# Patient Record
Sex: FEMALE | Race: WHITE | NOT HISPANIC OR LATINO | Employment: PART TIME | URBAN - METROPOLITAN AREA
[De-identification: names, ages, dates, MRNs, and addresses within clinical notes are randomized per-mention and may not be internally consistent; named-entity substitution may affect disease eponyms.]

---

## 2018-01-10 NOTE — PROGRESS NOTES
Assessment    1  Encounter for preventive health examination (V70 0) (Z00 00)   2  Need for lipid screening (V77 91) (Z13 220)   3  Encounter for screening for cardiovascular disorders (V81 2) (Z13 6)    Plan  Encounter for screening for cardiovascular disorders, Need for lipid screening    · (1) COMPREHENSIVE METABOLIC PANEL; Status:Active; Requested for:02Nov2016;    · (1) LIPID PANEL, FASTING; Status:Active; Requested [id number]; Health Maintenance    · Follow-up visit in 1 year Evaluation and Treatment  Follow-up  Status: Hold For -  Scheduling  Requested for: 02NSG4038   · Begin a limited exercise program ; Status:Complete;   Done: 89KTY9573   · Brush your teeth 3 times a day and floss at least once a day ; Status:Complete;   Done:  91KST1921   · Drink plenty of fluids ; Status:Complete;   Done: 94ZJS0389   · Eat a low fat and low cholesterol diet ; Status:Complete;   Done: 78PKW3448   · Eat foods that are high in calcium ; Status:Complete;   Done: 33HKO3299   · Use a sun block product with an SPF of 15 or more ; Status:Complete;   Done:  77RLY8008   · Vitamins can help you get daily requirements that your diet may not be giving you ;  Status:Complete;   Done: 05KJE9100   · We recommend routine visits to a dentist ; Status:Complete;   Done: 96AIA1881   · Call [phone] if: You find a new or different kind of lump in your breast ;  Status:Complete;   Done: 21JRG9837   · Call [phone] if: You have any warning signs of skin cancer ; Status:Complete;    Done: 12NGV3177   · *VB-Depression Screening; Status:Complete;   Done: 21RTF6272 01:43PM    Discussion/Summary  health maintenance visit Currently, she eats an adequate diet and has an adequate exercise regimen   the risks and benefits of cervical cancer screening were discussed cervical cancer screening is current cervical cancer screening is needed every three years 2 years ago at [facility] cancer screening: the risks and benefits of breast cancer screening were discussed  Colorectal cancer screening: the risks and benefits of colorectal cancer screening were discussed and colorectal cancer screening is not indicated  Osteoporosis screening: the risks and benefits of osteoporosis screening were discussed and bone mineral density testing is not indicated  Screening lab work includes glucose and lipid profile  The patient declines immunizations  She was advised to be evaluated by an ophthalmologist and a dentist  Advice and education were given regarding nutrition, calcium supplements, vitamin D supplements, contraception, sunscreen use, self skin examination, helmet use, seat belt use and fall risk reduction  Patient discussion: discussed with the patient  Chief Complaint  new pt here looking for new PCP  History of Present Illness  HM, Adult Female: The patient is being seen for a health maintenance and pap smear 2 years ago- [facility] evaluation  The last health maintenance visit was 1 year(s) ago  General Health: The patient's health since the last visit is described as good  She has regular dental visits  She denies vision problems  She denies hearing loss  Immunizations status: up to date  Lifestyle:  She consumes a diverse and healthy diet  She does not have any weight concerns  She exercises regularly  She does not use tobacco  She consumes alcohol  She denies drug use  Reproductive health:  she reports normal menses  Menstrual history:  age at menarche was 5  Recent menstrual periods: bleeding has been normal  The cycles have been regular  The duration of her recent periods has been regular  she uses no contraception  she is sexually active  she denies prior pregnancies  Screening: cancer screening reviewed and updated  metabolic screening reviewed and updated  risk screening reviewed and updated        Review of Systems    Constitutional: No fever, no chills, feels well, no tiredness, no recent weight gain or weight loss    Eyes: No complaints of eye pain, no red eyes, no eyesight problems, no discharge, no dry eyes, no itching of eyes  ENT: no complaints of earache, no loss of hearing, no nose bleeds, no nasal discharge, no sore throat, no hoarseness  Cardiovascular: No complaints of slow heart rate, no fast heart rate, no chest pain, no palpitations, no leg claudication, no lower extremity edema  Respiratory: No complaints of shortness of breath, no wheezing, no cough, no SOB on exertion, no orthopnea, no PND  Gastrointestinal: No complaints of abdominal pain, no constipation, no nausea or vomiting, no diarrhea, no bloody stools  Genitourinary: No complaints of dysuria, no incontinence, no pelvic pain, no dysmenorrhea, no vaginal discharge or bleeding  Musculoskeletal: No complaints of arthralgias, no myalgias, no joint swelling or stiffness, no limb pain or swelling  Integumentary: No complaints of skin rash or lesions, no itching, no skin wounds, no breast pain or lump  Neurological: No complaints of headache, no confusion, no convulsions, no numbness, no dizziness or fainting, no tingling, no limb weakness, no difficulty walking  Psychiatric: Not suicidal, no sleep disturbance, no anxiety or depression, no change in personality, no emotional problems  Endocrine: No complaints of proptosis, no hot flashes, no muscle weakness, no deepening of the voice, no feelings of weakness  Hematologic/Lymphatic: No complaints of swollen glands, no swollen glands in the neck, does not bleed easily, does not bruise easily        Past Medical History    · History of Broken shoulder (812 00) (S42 90XA)   · History of fracture of rib (V15 51) (Z87 81)   · History of Torn ACL (844 2) (W98 743O)    Family History  Mother    · Family history of arteritis (V17 49) (Z82 49)   · Family history of malignant neoplasm of uterus (V16 49) (Z80 49)  Father    · Family history of malignant neoplasm of prostate (V16 42) (E80 51)    Social History    · Never a smoker   · Social drinker (V49 89) (Z78 9)    Current Meds   1  No Reported Medications Recorded    Allergies    1  No Known Drug Allergies    Vitals   Recorded: 13YQB1830 44:79NW   Systolic 260   Diastolic 64   Heart Rate 72   Respiration 16   Temperature 98 1 F   Height 5 ft 3 94 in   Weight 176 lb 2 oz   BMI Calculated 30 29   BSA Calculated 1 85     Physical Exam    Constitutional   General appearance: No acute distress, well appearing and well nourished  Head and Face   Head and face: Normal     Palpation of the face and sinuses: No sinus tenderness  Eyes   Conjunctiva and lids: No swelling, erythema or discharge  Pupils and irises: Equal, round, reactive to light  Ophthalmoscopic examination: Normal fundi and optic discs  Ears, Nose, Mouth, and Throat   External inspection of ears and nose: Normal     Otoscopic examination: Tympanic membranes translucent with normal light reflex  Canals patent without erythema  Hearing: Normal     Nasal mucosa, septum, and turbinates: Normal without edema or erythema  Lips, teeth, and gums: Normal, good dentition  Oropharynx: Normal with no erythema, edema, exudate or lesions  Neck   Neck: Supple, symmetric, trachea midline, no masses  Thyroid: Normal, no thyromegaly  Pulmonary   Respiratory effort: No increased work of breathing or signs of respiratory distress  Percussion of chest: Normal     Palpation of chest: Normal     Auscultation of lungs: Clear to auscultation  Cardiovascular   Palpation of heart: Normal PMI, no thrills  Auscultation of heart: Normal rate and rhythm, normal S1 and S2, no murmurs  Examination of extremities for edema and/or varicosities: Normal     Chest   Chest: Normal     Abdomen   Abdomen: Non-tender, no masses  Liver and spleen: No hepatomegaly or splenomegaly  Examination for hernias: No hernia appreciated      Genitourinary   External genitalia and vagina: Normal, no lesions appreciated  Urethra: Normal, no discharge  Bladder: Not distended, no tenderness  Cervix: Normal, no lesions, Pap obtained  Uterus: Normal size, no tenderness, no masses  Adnexa/Parametria: Normal, no masses or tenderness  Lymphatic   Palpation of lymph nodes in neck: No lymphadenopathy  Palpation of lymph nodes in axillae: No lymphadenopathy  Palpation of lymph nodes in groin: No lymphadenopathy  Palpation of lymph nodes in other areas: No lymphadenopathy  Musculoskeletal   Gait and station: Normal     Digits and nails: Normal without clubbing or cyanosis  Joints, bones, and muscles: Normal     Range of motion: Normal     Stability: Normal     Muscle strength/tone: Normal     Skin   Skin and subcutaneous tissue: Normal without rashes or lesions  Palpation of skin and subcutaneous tissue: Normal turgor  Neurologic   Cranial nerves: Cranial nerves II-XII intact  Cortical function: Normal mental status  Reflexes: 2+ and symmetric  Sensation: No sensory loss  Coordination: Normal finger to nose and heel to shin  Psychiatric   Judgment and insight: Normal     Orientation to person, place, and time: Normal     Recent and remote memory: Intact      Mood and affect: Normal        Results/Data  *VB-Depression Screening [id number] 01:43PM Aliza Browne     Test Name Result Flag Reference   Depression Scale Result      Depression Screen - Negative For Symptoms       Signatures   Electronically signed by : Iris Berry MD; Nov 2 2016  1:44PM EST                       (Author)

## 2018-01-15 NOTE — RESULT NOTES
Verified Results  (1) LIPID PANEL, FASTING [id number] 12:51PM Georgia Browne [place] Order Number: [id number]     Test Name Result Flag Reference   CHOLESTEROL 209 mg/dL H    HDL,DIRECT 82 mg/dL H 40-60   Specimen collection should occur prior to Metamizole administration due to the potential for falsely depressed results  LDL CHOLESTEROL CALCULATED 114 mg/dL H 0-100   - Patient Instructions: This is a fasting blood test  Water,black tea or black  coffee only after 9:00pm the night before test   Drink 2 glasses of water the morning of test     - Patient Instructions: This is a fasting blood test  Water,black tea or black  coffee only after 9:00pm the night before test Drink 2 glasses of water the morning of test   Triglyceride:         Normal              <150 mg/dl       Borderline High    150-199 mg/dl       High               200-499 mg/dl       Very High          >499 mg/dl  Cholesterol:         Desirable        <200 mg/dl      Borderline High  200-239 mg/dl      High             >239 mg/dl  HDL Cholesterol:        High    >59 mg/dL      Low     <41 mg/dL  LDL CALCULATED:    This screening LDL is a calculated result  It does not have the accuracy of the Direct Measured LDL in the monitoring of patients with hyperlipidemia and/or statin therapy  Direct Measure LDL (NVO958) must be ordered separately in these patients  TRIGLYCERIDES 65 mg/dL  <=150   Specimen collection should occur prior to N-Acetylcysteine or Metamizole administration due to the potential for falsely depressed results  (1) COMPREHENSIVE METABOLIC PANEL 56UKO6211 24:36EA Georgia Browne [place] Order Number: [id number]     Test Name Result Flag Reference   GLUCOSE,RANDM 84 mg/dL     If the patient is fasting, the ADA then defines impaired fasting glucose as > 100 mg/dL and diabetes as > or equal to 123 mg/dL     SODIUM 138 mmol/L  136-145   POTASSIUM 3 9 mmol/L  3 5-5 3   CHLORIDE 102 mmol/L  100-108   CARBON DIOXIDE 27 mmol/L  21-32   ANION GAP (CALC) 9 mmol/L  4-13   BLOOD UREA NITROGEN 9 mg/dL  5-25   CREATININE 0 63 mg/dL  0 60-1 30   Standardized to IDMS reference method   CALCIUM 9 4 mg/dL  8 3-10 1   BILI, TOTAL 0 87 mg/dL  0 20-1 00   ALK PHOSPHATAS 69 U/L     ALT (SGPT) 63 U/L  12-78   AST(SGOT) 36 U/L  5-45   ALBUMIN 4 2 g/dL  3 5-5 0   TOTAL PROTEIN 7 7 g/dL  6 4-8 2   eGFR Non-African American      >60 0 ml/min/1 73sq m   - Patient Instructions: This is a fasting blood test  Water,black tea or black  coffee only after 9:00pm the night before test Drink 2 glasses of water the morning of test   National Kidney Disease Education Program recommendations are as follows:  GFR calculation is accurate only with a steady state creatinine  Chronic Kidney disease less than 60 ml/min/1 73 sq  meters  Kidney failure less than 15 ml/min/1 73 sq  meters         Discussion/Summary   Normal liver and kidney function   blood sugar level is within the normal range   good cholesterol HDL is high which is very good   overall looks very good!   - Dr Easton Ku   Electronically signed by : Alvin Almodovar MD; Nov 4 2016  8:26AM EST                       (Author)

## 2019-10-13 ENCOUNTER — OFFICE VISIT (OUTPATIENT)
Dept: URGENT CARE | Age: 49
End: 2019-10-13
Payer: COMMERCIAL

## 2019-10-13 VITALS
DIASTOLIC BLOOD PRESSURE: 64 MMHG | HEART RATE: 64 BPM | BODY MASS INDEX: 31.41 KG/M2 | TEMPERATURE: 97.8 F | RESPIRATION RATE: 20 BRPM | OXYGEN SATURATION: 100 % | SYSTOLIC BLOOD PRESSURE: 112 MMHG | WEIGHT: 184 LBS | HEIGHT: 64 IN

## 2019-10-13 DIAGNOSIS — S60.00XA CONTUSION OF FINGER OF LEFT HAND, INITIAL ENCOUNTER: ICD-10-CM

## 2019-10-13 DIAGNOSIS — S61.219A LACERATION WITHOUT FOREIGN BODY OF UNSPECIFIED FINGER WITHOUT DAMAGE TO NAIL, INITIAL ENCOUNTER: ICD-10-CM

## 2019-10-13 DIAGNOSIS — T14.90XA INJURY: Primary | ICD-10-CM

## 2019-10-13 PROCEDURE — 12001 RPR S/N/AX/GEN/TRNK 2.5CM/<: CPT | Performed by: PHYSICIAN ASSISTANT

## 2019-10-13 PROCEDURE — 99203 OFFICE O/P NEW LOW 30 MIN: CPT | Performed by: PHYSICIAN ASSISTANT

## 2019-10-13 RX ORDER — CEPHALEXIN 500 MG/1
500 CAPSULE ORAL EVERY 8 HOURS SCHEDULED
Qty: 30 CAPSULE | Refills: 0 | Status: SHIPPED | OUTPATIENT
Start: 2019-10-13 | End: 2019-10-23

## 2019-10-13 NOTE — PATIENT INSTRUCTIONS
Apply OTC antibiotic ointment to laceration  -monitor for signs of infection- redness, drainage, increased pain, fevers, chills  -wear finger splint as needed  -Ice and elevate  -OTC ibuprofen / tylenol as needed for pain  -follow up with PCP 10 days for suture removal  -ER if worsen

## 2019-10-13 NOTE — PROGRESS NOTES
[facility]        NAME: Lary Walker is a 47 y o  female  : 1970    MRN: [id number]  DATE: 2019  TIME: 7:09 PM    Assessment and Plan   Injury Yessica Sanchez  1  Injury  XR finger left fourth digit-ring    cephalexin (KEFLEX) 500 mg capsule   2  Laceration without foreign body of unspecified finger without damage to nail, initial encounter  cephalexin (KEFLEX) 500 mg capsule   3  Contusion of finger of left hand, initial encounter  cephalexin (KEFLEX) 500 mg capsule         Patient Instructions   -patient was educated that it is best to perform laceration repairs within the 1st 12 hours  After that there is increased risk of infection  Patient understands but is worried about the laceration not healing and further   She would like sutures placed  I agreed to put 2 loose sutures in to help with healing  Patient understands and agrees  She is placed on antibiotics to prevent infection  Apply OTC antibiotic ointment to laceration  -monitor for signs of infection- redness, drainage, increased pain, fevers, chills  -wear finger splint as needed  -Ice and elevate  -OTC ibuprofen / tylenol as needed for pain  -follow up with PCP 10 days for suture removal    Proceed to  ER if symptoms worsen  Chief Complaint     Chief Complaint   Patient presents with    Laceration     injury occurred last around 12pm yesterday  pt has bruising noted  and there is a small laceration noted with no drainage  bike rack fell on pt left ring finger   Hand Injury         History of Present Illness       Patient presents with left ring finger pain and laceration  This occurred yesterday  She states the bike rack fell on her finger  She did ice on it  She is unsure if he needs sutures  She came in today due to concern of the laceration  She denies any numbness or tingling  Review of Systems   Review of Systems   Constitutional: Negative  HENT: Negative  Respiratory: Negative  Cardiovascular: Negative  Gastrointestinal: Negative  Musculoskeletal:        Finger pain   Skin: Positive for wound  Neurological: Negative  Psychiatric/Behavioral: Negative  Current Medications       Current Outpatient Medications:     cephalexin (KEFLEX) 500 mg capsule, Take 1 capsule (500 mg total) by mouth every 8 (eight) hours for 10 days, Disp: 30 capsule, Rfl: 0    Current Allergies     Allergies as of 10/13/2019    (No Known Allergies)            The following portions of the patient's history were reviewed and updated as appropriate: allergies, current medications, past family history, past medical history, past social history, past surgical history and problem list      History reviewed  No pertinent past medical history  History reviewed  No pertinent surgical history  No family history on file  Medications have been verified  Objective   /64 (BP Location: Right arm, Patient Position: Sitting, Cuff Size: Standard)   Pulse 64   Temp 97 8 °F (36 6 °C) (Temporal)   Resp 20   Ht 5' 4" (1 626 m)   Wt 83 5 kg (184 lb)   SpO2 100%   BMI 31 58 kg/m²          Physical Exam     Physical Exam   Constitutional: She is oriented to person, place, and time  She appears well-developed and well-nourished  No distress  HENT:   Head: Normocephalic and atraumatic  Eyes: EOM are normal    Pulmonary/Chest: Effort normal    Musculoskeletal:   Left ring finger with swelling, ecchymosis, tenderness to palpation  Small laceration on the pad of the finger  No surrounding erythema  No active drainage  Neurological: She is alert and oriented to person, place, and time  No sensory deficit  Skin: Skin is warm and dry  She is not diaphoretic  Psychiatric: She has a normal mood and affect  Her behavior is normal    Nursing note and vitals reviewed      Laceration repair  Date/Time: 10/13/2019 7:08 PM  Performed by: Lindsey Gay PA-C  Authorized by: Lindsey Gay MIKE   Consent: Verbal consent obtained  Consent given by: patient  Patient identity confirmed: verbally with patient  Body area: upper extremity  Location details: left ring finger  Laceration length: 0 5 cm  Foreign bodies: no foreign bodies  Tendon involvement: none  Nerve involvement: none  Vascular damage: no  Anesthesia: local infiltration    Anesthesia:  Local Anesthetic: lidocaine 1% without epinephrine      Procedure Details:  Skin closure: 4-0 nylon  Number of sutures: 2  Technique: simple  Approximation: loose  Approximation difficulty: simple  Dressing: antibiotic ointment and 4x4 sterile gauze        I have personally reviewed pertinent films in PACS and my interpretation is No osseous abnormality

## 2019-10-23 ENCOUNTER — OFFICE VISIT (OUTPATIENT)
Dept: URGENT CARE | Age: 49
End: 2019-10-23
Payer: COMMERCIAL

## 2019-10-23 VITALS
HEART RATE: 52 BPM | RESPIRATION RATE: 16 BRPM | OXYGEN SATURATION: 100 % | SYSTOLIC BLOOD PRESSURE: 104 MMHG | DIASTOLIC BLOOD PRESSURE: 63 MMHG | TEMPERATURE: 98.6 F

## 2019-10-23 DIAGNOSIS — Z48.02 ENCOUNTER FOR REMOVAL OF SUTURES: Primary | ICD-10-CM

## 2019-10-23 PROCEDURE — 99213 OFFICE O/P EST LOW 20 MIN: CPT | Performed by: PHYSICIAN ASSISTANT

## 2019-10-24 NOTE — PROGRESS NOTES
[facility]        NAME: Ugo Pereyra is a 47 y o  female  : 1970    MRN: [id number]  DATE: 2019  TIME: 10:36 PM    Assessment and Plan   Encounter for removal of sutures [Z48 02]  1  Encounter for removal of sutures           Patient Instructions     -two sutures removed without complication  -apply OTC antibiotic ointment as needed  -steri strip for 1 week  Follow up with PCP in 3-5 days  Proceed to  ER if symptoms worsen  Chief Complaint     Chief Complaint   Patient presents with    Suture / Staple Removal     L 4th digit         History of Present Illness       Patient presents for suture removal of her 4th finger  The suture were placed 10 days ago She denies any drainage or pain  She has been kepeing it covered and using OTC antibiotics ointment  Review of Systems   Review of Systems   Constitutional: Negative  HENT: Negative  Respiratory: Negative  Cardiovascular: Negative  Gastrointestinal: Negative  Musculoskeletal: Negative  Neurological: Negative  Psychiatric/Behavioral: Negative  Current Medications       Current Outpatient Medications:     cephalexin (KEFLEX) 500 mg capsule, Take 1 capsule (500 mg total) by mouth every 8 (eight) hours for 10 days (Patient not taking: Reported on 10/23/2019), Disp: 30 capsule, Rfl: 0    Current Allergies     Allergies as of 10/23/2019    (No Known Allergies)            The following portions of the patient's history were reviewed and updated as appropriate: allergies, current medications, past family history, past medical history, past social history, past surgical history and problem list      History reviewed  No pertinent past medical history  History reviewed  No pertinent surgical history  History reviewed  No pertinent family history  Medications have been verified          Objective   /63   Pulse (!) 52   Temp 98 6 °F (37 °C)   Resp 16   SpO2 100%        Physical Exam Physical Exam   Constitutional: She is oriented to person, place, and time  She appears well-developed and well-nourished  Pulmonary/Chest: Effort normal    Neurological: She is alert and oriented to person, place, and time  No sensory deficit  Skin: Skin is warm and dry  Wound healed well, no active drainage, no erythema, sutures removed  Sensation intact  ROM intact    Psychiatric: She has a normal mood and affect  Her behavior is normal    Vitals reviewed  Suture removal  Date/Time: 10/23/2019 10:39 PM  Performed by: Lindsey Gay PA-C  Authorized by: Lindsey Gay PA-C     Patient location:  Clinic  Consent:     Consent obtained:  Verbal    Consent given by:  Patient  Universal protocol:     Patient identity confirmed:  Verbally with patient  Location:     Laterality:  Left    Location:  Upper extremity    Upper extremity location:  Hand    Hand location:  L ring finger  Procedure details: Tools used:  Suture removal kit    Wound appearance:  No sign(s) of infection, good wound healing and clean    Number of sutures removed:  2  Post-procedure details:     Post-removal:  No dressing applied    Patient tolerance of procedure:   Tolerated well, no immediate complications